# Patient Record
Sex: MALE | Race: WHITE | NOT HISPANIC OR LATINO
[De-identification: names, ages, dates, MRNs, and addresses within clinical notes are randomized per-mention and may not be internally consistent; named-entity substitution may affect disease eponyms.]

---

## 2018-01-05 ENCOUNTER — APPOINTMENT (OUTPATIENT)
Dept: CARDIOLOGY | Facility: CLINIC | Age: 23
End: 2018-01-05
Payer: COMMERCIAL

## 2018-01-05 VITALS — SYSTOLIC BLOOD PRESSURE: 110 MMHG | DIASTOLIC BLOOD PRESSURE: 70 MMHG

## 2018-01-05 VITALS
OXYGEN SATURATION: 98 % | TEMPERATURE: 98.8 F | HEIGHT: 73 IN | WEIGHT: 206 LBS | BODY MASS INDEX: 27.3 KG/M2 | DIASTOLIC BLOOD PRESSURE: 79 MMHG | HEART RATE: 85 BPM | SYSTOLIC BLOOD PRESSURE: 122 MMHG

## 2018-01-05 DIAGNOSIS — F15.90 OTHER STIMULANT USE, UNSPECIFIED, UNCOMPLICATED: ICD-10-CM

## 2018-01-05 DIAGNOSIS — R00.2 PALPITATIONS: ICD-10-CM

## 2018-01-05 DIAGNOSIS — R07.89 OTHER CHEST PAIN: ICD-10-CM

## 2018-01-05 DIAGNOSIS — R06.00 DYSPNEA, UNSPECIFIED: ICD-10-CM

## 2018-01-05 PROCEDURE — 99204 OFFICE O/P NEW MOD 45 MIN: CPT

## 2018-01-05 PROCEDURE — 93224 XTRNL ECG REC UP TO 48 HRS: CPT

## 2018-01-09 ENCOUNTER — NON-APPOINTMENT (OUTPATIENT)
Age: 23
End: 2018-01-09

## 2018-01-11 ENCOUNTER — APPOINTMENT (OUTPATIENT)
Dept: CARDIOLOGY | Facility: CLINIC | Age: 23
End: 2018-01-11
Payer: COMMERCIAL

## 2018-01-11 PROCEDURE — 93306 TTE W/DOPPLER COMPLETE: CPT

## 2018-01-12 ENCOUNTER — APPOINTMENT (OUTPATIENT)
Dept: CARDIOLOGY | Facility: CLINIC | Age: 23
End: 2018-01-12
Payer: COMMERCIAL

## 2018-01-12 DIAGNOSIS — I47.1 SUPRAVENTRICULAR TACHYCARDIA: ICD-10-CM

## 2018-01-12 PROCEDURE — 93015 CV STRESS TEST SUPVJ I&R: CPT

## 2020-08-04 ENCOUNTER — APPOINTMENT (OUTPATIENT)
Dept: CARDIOLOGY | Facility: CLINIC | Age: 25
End: 2020-08-04
Payer: COMMERCIAL

## 2020-08-04 ENCOUNTER — NON-APPOINTMENT (OUTPATIENT)
Age: 25
End: 2020-08-04

## 2020-08-04 VITALS — DIASTOLIC BLOOD PRESSURE: 75 MMHG | SYSTOLIC BLOOD PRESSURE: 115 MMHG

## 2020-08-04 VITALS
OXYGEN SATURATION: 97 % | SYSTOLIC BLOOD PRESSURE: 121 MMHG | WEIGHT: 205 LBS | HEART RATE: 76 BPM | DIASTOLIC BLOOD PRESSURE: 65 MMHG | TEMPERATURE: 97.7 F

## 2020-08-04 PROCEDURE — 99214 OFFICE O/P EST MOD 30 MIN: CPT

## 2020-08-04 PROCEDURE — 93000 ELECTROCARDIOGRAM COMPLETE: CPT

## 2020-08-04 NOTE — HISTORY OF PRESENT ILLNESS
[FreeTextEntry1] : 25-year-old male with a history of SVT who is being seen in follow-up because of a recurrence of palpitations.  He was initially seen about 2-1/2 years ago and noted to have prolonged episodes of SVT.  He was supposed to see electrophysiology but his symptoms stopped and he has been generally well since.  His current symptoms began after he started a ketogenic diet with his mother 2 weeks ago and have improved considerably since he discontinued the diet last week.  He still notes some intermittent racing.

## 2020-08-04 NOTE — REVIEW OF SYSTEMS
[Palpitations] : palpitations [Anxiety] : anxiety [Negative] : Endocrine [Chest Pain] : no chest pain [Shortness Of Breath] : no shortness of breath

## 2020-08-04 NOTE — DISCUSSION/SUMMARY
[FreeTextEntry1] : Mr. Mcmillan notes a recurrence of his palpitations during the past few weeks associated with using a ketogenic diet.  His exam shows regular rhythm at a rate of about 75, normal blood pressure, and a normal cardiac exam.  His EKG is within normal limits.\par \par A Holter was placed to see if he is still having frequent episodes of SVT.

## 2020-08-04 NOTE — PHYSICAL EXAM
[Normal Appearance] : normal appearance [General Appearance - Well Developed] : well developed [Well Groomed] : well groomed [General Appearance - Well Nourished] : well nourished [No Deformities] : no deformities [Normal Conjunctiva] : the conjunctiva exhibited no abnormalities [General Appearance - In No Acute Distress] : no acute distress [Eyelids - No Xanthelasma] : the eyelids demonstrated no xanthelasmas [Normal Oral Mucosa] : normal oral mucosa [No Oral Pallor] : no oral pallor [Normal Jugular Venous A Waves Present] : normal jugular venous A waves present [No Oral Cyanosis] : no oral cyanosis [Normal Jugular Venous V Waves Present] : normal jugular venous V waves present [No Jugular Venous Negrete A Waves] : no jugular venous negrete A waves [Respiration, Rhythm And Depth] : normal respiratory rhythm and effort [Auscultation Breath Sounds / Voice Sounds] : lungs were clear to auscultation bilaterally [Exaggerated Use Of Accessory Muscles For Inspiration] : no accessory muscle use [Heart Sounds] : normal S1 and S2 [Heart Rate And Rhythm] : heart rate and rhythm were normal [Abdomen Soft] : soft [Murmurs] : no murmurs present [Abdomen Mass (___ Cm)] : no abdominal mass palpated [Abdomen Tenderness] : non-tender [Gait - Sufficient For Exercise Testing] : the gait was sufficient for exercise testing [Abnormal Walk] : normal gait [Nail Clubbing] : no clubbing of the fingernails [Cyanosis, Localized] : no localized cyanosis [Petechial Hemorrhages (___cm)] : no petechial hemorrhages [Skin Color & Pigmentation] : normal skin color and pigmentation [] : no rash [No Venous Stasis] : no venous stasis [Skin Lesions] : no skin lesions [No Skin Ulcers] : no skin ulcer [No Xanthoma] : no  xanthoma was observed [Oriented To Time, Place, And Person] : oriented to person, place, and time [Affect] : the affect was normal [Mood] : the mood was normal [No Anxiety] : not feeling anxious

## 2020-08-07 ENCOUNTER — NON-APPOINTMENT (OUTPATIENT)
Age: 25
End: 2020-08-07

## 2021-11-22 ENCOUNTER — TRANSCRIPTION ENCOUNTER (OUTPATIENT)
Age: 26
End: 2021-11-22